# Patient Record
Sex: MALE | Race: WHITE
[De-identification: names, ages, dates, MRNs, and addresses within clinical notes are randomized per-mention and may not be internally consistent; named-entity substitution may affect disease eponyms.]

---

## 2018-02-22 ENCOUNTER — HOSPITAL ENCOUNTER (INPATIENT)
Dept: HOSPITAL 41 - JD.ED | Age: 63
LOS: 1 days | Discharge: HOME | DRG: 897 | End: 2018-02-23
Payer: MEDICARE

## 2018-02-22 DIAGNOSIS — I10: ICD-10-CM

## 2018-02-22 DIAGNOSIS — F17.200: ICD-10-CM

## 2018-02-22 DIAGNOSIS — E78.00: ICD-10-CM

## 2018-02-22 DIAGNOSIS — Z79.899: ICD-10-CM

## 2018-02-22 DIAGNOSIS — E11.65: ICD-10-CM

## 2018-02-22 DIAGNOSIS — N31.9: ICD-10-CM

## 2018-02-22 DIAGNOSIS — J44.9: ICD-10-CM

## 2018-02-22 DIAGNOSIS — F02.80: ICD-10-CM

## 2018-02-22 DIAGNOSIS — Z91.19: ICD-10-CM

## 2018-02-22 DIAGNOSIS — R53.1: ICD-10-CM

## 2018-02-22 DIAGNOSIS — F12.929: Primary | ICD-10-CM

## 2018-02-22 DIAGNOSIS — J32.1: ICD-10-CM

## 2018-02-22 DIAGNOSIS — G35: ICD-10-CM

## 2018-02-22 DIAGNOSIS — F06.8: ICD-10-CM

## 2018-02-22 DIAGNOSIS — E78.5: ICD-10-CM

## 2018-02-22 DIAGNOSIS — Z79.82: ICD-10-CM

## 2018-02-22 DIAGNOSIS — C91.10: ICD-10-CM

## 2018-02-22 DIAGNOSIS — Z79.4: ICD-10-CM

## 2018-02-22 PROCEDURE — 71045 X-RAY EXAM CHEST 1 VIEW: CPT

## 2018-02-22 PROCEDURE — 70450 CT HEAD/BRAIN W/O DYE: CPT

## 2018-02-22 PROCEDURE — C9113 INJ PANTOPRAZOLE SODIUM, VIA: HCPCS

## 2018-02-22 PROCEDURE — 80053 COMPREHEN METABOLIC PANEL: CPT

## 2018-02-22 PROCEDURE — 84484 ASSAY OF TROPONIN QUANT: CPT

## 2018-02-22 PROCEDURE — 81001 URINALYSIS AUTO W/SCOPE: CPT

## 2018-02-22 PROCEDURE — 87804 INFLUENZA ASSAY W/OPTIC: CPT

## 2018-02-22 PROCEDURE — 99285 EMERGENCY DEPT VISIT HI MDM: CPT

## 2018-02-22 PROCEDURE — 86140 C-REACTIVE PROTEIN: CPT

## 2018-02-22 PROCEDURE — 84443 ASSAY THYROID STIM HORMONE: CPT

## 2018-02-22 PROCEDURE — 80306 DRUG TEST PRSMV INSTRMNT: CPT

## 2018-02-22 PROCEDURE — 85025 COMPLETE CBC W/AUTO DIFF WBC: CPT

## 2018-02-22 PROCEDURE — 85652 RBC SED RATE AUTOMATED: CPT

## 2018-02-22 PROCEDURE — 87040 BLOOD CULTURE FOR BACTERIA: CPT

## 2018-02-22 PROCEDURE — 96372 THER/PROPH/DIAG INJ SC/IM: CPT

## 2018-02-22 PROCEDURE — P9612 CATHETERIZE FOR URINE SPEC: HCPCS

## 2018-02-22 PROCEDURE — 83735 ASSAY OF MAGNESIUM: CPT

## 2018-02-22 PROCEDURE — 83036 HEMOGLOBIN GLYCOSYLATED A1C: CPT

## 2018-02-22 PROCEDURE — 82550 ASSAY OF CK (CPK): CPT

## 2018-02-22 PROCEDURE — 36415 COLL VENOUS BLD VENIPUNCTURE: CPT

## 2018-02-22 PROCEDURE — 80349 CANNABINOIDS NATURAL: CPT

## 2018-02-22 PROCEDURE — 93005 ELECTROCARDIOGRAM TRACING: CPT

## 2018-02-22 RX ADMIN — AMOXICILLIN AND CLAVULANATE POTASSIUM SCH TAB: 875; 125 TABLET, FILM COATED ORAL at 20:52

## 2018-02-22 NOTE — CR
Chest: Portable view of the chest was obtained.

 

Comparison: Prior chest x-ray of 02/12/16.

 

Heart size and mediastinum are normal.  Lung markings are mildly 

increased believed to be chronic and accentuated from portable 

technique.  No acute parenchymal densities are seen.  Minimal 

scoliosis is noted within the spine.

 

Impression:

1.  Incidental findings.  Nothing acute is suspected on portable chest

 x-ray.

 

Diagnostic code #2

## 2018-02-22 NOTE — CT
Head CT

 

Technique: Multiple axial sections through the brain were obtained.  

Intravenous contrast was not utilized.

 

Comparison: No prior head CT exam.

 

Findings: Ventricles along with basal cisterns and sulci over the 

convexities are moderately prominent.  Diminished density is noted 

within the periventricular and subcortical white matter which is felt 

to represent a combination of small vessel ischemic demyelination 

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  No other abnormal 

parenchymal densities are seen.  No evidence of intracranial 

hemorrhage.  No midline shift or mass effect is seen.

 

Bone window settings were reviewed which shows opacified left frontal 

sinus as well as partial opacification of the right frontal sinus.  

Sinus appears somewhat expanded.  No acute calvarial abnormality is 

otherwise seen.

 

Impression:

1.  Opacified left frontal sinus and partially opacified right frontal

 sinus.  This causes some expansion of the sinus and difficult to 

exclude a mildly aggressive infection, mucocele or even sinus 

neoplasm.

2.  Findings intracranially as noted above felt to be chronic.

3.  No acute intracranial abnormality is appreciated.

 

Diagnostic code #9

## 2018-02-22 NOTE — EDM.PDOC
ED HPI GENERAL MEDICAL PROBLEM





- General


Chief Complaint: Neurological Problem


Stated Complaint: [facility]


Time Seen by Provider: 02/22/18 07:52


Source of Information: Reports: Patient, Family (Wife, daughter)


History Limitations: Reports: Altered Mental Status (The majority of the patient

's history is provided by his wife)





- History of Present Illness


INITIAL COMMENTS - FREE TEXT/NARRATIVE: 





The patient's wife states that the patient has a history of MS, diagnosed 

approximately 2003, likely primary progressive. She states that he has been 

under the care of the Neurologist Dr. Margaret Edgar, but that the patient 

has never received any treatment, and, as far as the patient's wife is aware, 

the patient has never received a course of steroids. The patient's wife states 

that Dr. Edgar, to their knowledge, no longer works in the area.





The patient also has a history of CLL, under the care of an Oncologist at [facility], but, again, the patient's wife states that the patient has 

never received any treatment. She states that they check his blood work twice a 

year, but that is all, so far.





The patient's wife states that the patient was talking like he was drunk, 

without emotion, yesterday. Today he was unable to ambulate. He ordinarily 

ambulates with a cane, but this morning he slid out of bed, unable to support 

his own weight (he is uninjured). His blood glucose was found to be 275 - the 

patient is diabetic but states that he did not take his usual Lantus insulin 

this morning.





The patient denies having any pain. He denies having a headache. He denies 

recent fever, chills, cough, chest pain, dyspnea, nausea, vomiting, constipation

, diarrhea, urinary symptoms, weight gain, or weight loss.





The patient's wife states that he had similar symptoms last year, stating that 

he was admitted to the ICU for 3 days. Medical records indicate that the 

patient was admitted to this hospital on 2/5/2015 for what sounds to be a MS 

exacerbation. He was then readmitted on 2/12/2015 for DKA.





The patient's PCP is Dr. Brittanie Kirk.











- Related Data


 Allergies











Allergy/AdvReac Type Severity Reaction Status Date / Time


 


No Known Allergies Allergy   Verified 02/22/18 07:38











Home Meds: 


 Home Meds





Aspirin [Halfprin] 1 tab PO DAILY 02/05/15 [History]


Simvastatin 10 mg PO DAILY 02/05/15 [History]


buPROPion [Wellbutrin XL] 300 mg PO DAILY 02/05/15 [History]


Insulin Aspart [NovoLOG] 0 units SUBCUT ASDIRECTED 02/12/16 [History]


Lisinopril [Zestril] 5 mg PO DAILY 02/12/16 [History]


Insulin Glarg,Human.Rec.Analog [LantUS Solostar] 44 units SUBCUT DAILY 02/22/18 

[History]











Past Medical History


Cardiovascular History: Reports: High Cholesterol, Hypertension


Respiratory History: Reports: COPD


Genitourinary History: Reports: Neurogenic Bladder


Neurological History: Reports: MS


Endocrine/Metabolic History: Reports: Diabetes, Type II


Oncologic (Cancer) History: Reports: Leukemia (CLL)





- Past Surgical History


GI Surgical History: Reports: Appendectomy





Social & Family History





- Family History


Family Medical History: Noncontributory





- Tobacco Use


Smoking Status *Q: Current Some Day Smoker


Years of Tobacco use: 32


Packs/Tins Daily: 0.1





- Caffeine Use


Caffeine Use: Reports: Coffee, Soda





- Alcohol Use


Alcohol Use History: Yes


Days Per Week of Alcohol Use: 0


Number of Drinks Per Day: 1


Total Drinks Per Week: 0


Alcohol Use Frequency: Socially





- Recreational Drug Use


Recreational Drug Use: Yes


Drug Use in Last 12 Months: Yes


Recreational Drug Type: Reports: Marijuana/Hashish (on occasion)





- Living Situation & Occupation


Living situation: Reports: , with Spouse


Occupation: Disabled





ED ROS GENERAL





- Review of Systems


Review Of Systems: ROS reveals no pertinent complaints other than HPI.





ED EXAM, GENERAL





- Physical Exam


Exam: See Below


Exam Limited By: No Limitations


General Appearance: Alert, WD/WN, No Apparent Distress


Eye Exam: Bilateral Eye: Normal Inspection


Ears: Normal External Exam, Hearing Grossly Normal


Nose: Normal Inspection, No Blood


Throat/Mouth: Normal Inspection, Normal Lips, Normal Voice, No Airway Compromise


Head: Atraumatic, Normocephalic


Neck: Normal Inspection, Full Range of Motion


Respiratory/Chest: No Respiratory Distress, Lungs Clear, Normal Breath Sounds, 

No Accessory Muscle Use


Cardiovascular: Normal Peripheral Pulses, Regular Rate, Rhythm, No Gallop, No 

JVD, No Murmur, No Rub


Peripheral Pulses: 4+: Radial (L), Radial (R)


GI/Abdominal: Normal Bowel Sounds, Soft, Non-Tender, No Organomegaly, No 

Distention, No Abnormal Bruit, No Mass


 (Male) Exam: Deferred


Rectal (Males) Exam: Deferred


Back Exam: Normal Inspection, Full Range of Motion, NT


Extremities: Normal Inspection, Normal Range of Motion, No Pedal Edema, Normal 

Capillary Refill


Neurological: Alert, Other (Minimal weakness to the upper 70s, but more 

pronounced weakness to the lower extremity, particularly the left lower 

extremity, in both flexion and extension of the knee and hip. Plantarflexion 

and dorsiflexion felt to be normal bilaterally. Further, the patient had some 

difficulty following commands. He needed to be instructed numerous times to 

both flex and extend his left lower extremity. Sensory is normal.)


Psychiatric: Normal Affect


Skin Exam: Warm, Dry, Intact, Normal Color, No Rash





EKG INTERPRETATION


EKG Date: 02/22/18


Time: 09:04


Rhythm: NSR


Rate (Beats/Min): 69


Axis: Normal


P-Wave: Present


QRS: Normal


ST-T: Normal


QT: Normal





Course





- Vital Signs


Last Recorded V/S: 


 Last Vital Signs











Temp  36.2 C   02/22/18 07:35


 


Pulse  80   02/22/18 07:35


 


Resp  18   02/22/18 07:35


 


BP  108/59 L  02/22/18 07:35


 


Pulse Ox  94 L  02/22/18 07:35














- Orders/Labs/Meds


Orders: 


 Active Orders 24 hr











 Category Date Time Status


 


 Antiembolic Devices [RC] PER UNIT ROUTINE Care  02/22/18 11:45 Active


 


 EKG Documentation Completion [RC] STAT Care  02/22/18 08:16 Active


 


 Height and Weight [RC] DAILY Care  02/22/18 11:44 Active


 


 Intake and Output [RC] QSHIFT Care  02/22/18 11:44 Active


 


 Oxygen Therapy [RC] PRN Care  02/22/18 11:44 Active


 


 RT Aerosol Therapy [RC] ASDIRECTED Care  02/22/18 11:45 Active


 


 Up With Assistance [RC] ASDIRECTED Care  02/22/18 11:44 Active


 


 Up ad [place] [RC] ASDIRECTED Care  02/22/18 11:44 Active


 


 VTE/DVT Education [RC] PER UNIT ROUTINE Care  02/22/18 11:44 Active


 


 Vital Signs [RC] Q4H Care  02/22/18 11:44 Active


 


 Consistent Carbohydrate Diet [DIET] Diet  02/22/18 Lunch Active


 


 Heart Healthy Diet [DIET] Diet  02/22/18 Lunch Active


 


 A1C [GLYCOSYLATED HEMOGLOBIN,HGBA1C] [CHEM] Stat Lab  02/22/18 08:30 Received


 


 CBC WITH AUTO DIFF [HEME] AM Lab  02/23/18 05:11 Ordered


 


 CBC WITH AUTO DIFF [HEME] AM Lab  02/24/18 05:11 Ordered


 


 CBC WITH AUTO DIFF [HEME] AM Lab  02/25/18 05:11 Ordered


 


 CBC WITH AUTO DIFF [HEME] AM Lab  02/26/18 05:11 Ordered


 


 CULTURE BLOOD [BC] Stat Lab  02/22/18 08:30 Received


 


 CULTURE BLOOD [BC] Stat Lab  02/22/18 08:40 Received


 


 CULTURE NOSE [RM] Stat Lab  02/22/18 11:50 Ordered


 


 Acetaminophen [Tylenol] Med  02/22/18 11:44 Ordered





 650 mg PO Q4H PRN   


 


 Acetaminophen/HYDROcodone [Norco 325-5 MG] Med  02/22/18 11:44 Ordered





 1 tab PO Q4H PRN   


 


 Albuterol/Ipratropium [DuoNeb 3.0-0.5 MG/3 ML] Med  02/22/18 11:44 Ordered





 3 ml NEB Q4H PRN   


 


 Amoxicillin/Clavulanate K [Augmentin 875 MG/125 MG] Med  02/22/18 21:00 Ordered





 1 tab PO Q12HR   


 


 Aspirin [Halfprin] Med  02/23/18 09:00 Ordered





 DOSE mg PO DAILY   


 


 Bisacodyl [Dulcolax] Med  02/22/18 11:44 Ordered





 5 mg PO DAILY PRN   


 


 Docusate Sodium [Colace] Med  02/22/18 11:44 Ordered





 100 mg PO BID PRN   


 


 Docusate Sodium/Sennosides [Senna Plus] Med  02/22/18 11:44 Ordered





 1 tab PO BID PRN   


 


 Insulin Glarg,Human.Rec.Analog Med  02/23/18 09:00 Ordered





 44 units SUBCUT DAILY   


 


 LORazepam [Ativan] Med  02/22/18 11:44 Ordered





 1 mg IV Q6H PRN   


 


 LORazepam [Ativan] Med  02/22/18 11:47 Ordered





 2 mg IVPUSH Q4H PRN   


 


 Lisinopril [Prinivil] Med  02/23/18 09:00 Ordered





 5 mg PO DAILY   


 


 Magnesium Rep Pharmacy to Dose [Pharmacy to Dose - Med  02/22/18 12:00 Ordered





 Magnesium Replacement]   





 1 dose .XX ASDIRECTED   


 


 Magnesium Sulfate/Water [Magnesium Sulfate 2 GM in Med  02/22/18 11:47 Ordered





 Water 50 ML] 2 gm   





 Premix Bag 1 bag   





 IV ASDIRECTED   


 


 Metoprolol Tartrate [Lopressor] Med  02/22/18 11:47 Ordered





 5 mg IVPUSH Q4H PRN   


 


 Morphine Med  02/22/18 11:44 Ordered





 2 mg IVPUSH Q2H PRN   


 


 Ondansetron [Zofran] Med  02/22/18 11:44 Ordered





 4 mg IV Q6H PRN   


 


 Pantoprazole [ProTONIX IV***] Med  02/22/18 11:44 Once





 40 mg .XX ONETIME ONE   


 


 Polyethylene Glycol 3350 [MiraLAX] Med  02/22/18 11:44 Ordered





 17 gm PO DAILY PRN   


 


 Potassium Rep Pharmacy to Dose [Pharmacy to Dose - Med  02/22/18 12:00 Ordered





 Potassium Replacement]   





 1 dose .XX ASDIRECTED   


 


 Promethazine [Phenergan] 12.5 mg Med  02/22/18 11:44 Ordered





 Sodium Chloride 0.9% [Normal Saline] 50 ml   





 IV Q6H   


 


 Simvastatin [Simvastatin] Med  02/23/18 09:00 Ordered





 10 mg PO DAILY   


 


 Temazepam [Restoril] Med  02/22/18 11:44 Ordered





 15 mg PO BEDTIME PRN   


 


 buPROPion [Wellbutrin XL] Med  02/23/18 09:00 Ordered





 300 mg PO DAILY   


 


 Blood Culture x2 Reflex Set [OM.PC] Stat Oth  02/22/18 08:20 Ordered


 


 Sequential Compression Device [OM.PC] Per Unit Routine Oth  02/22/18 11:44 

Ordered


 


 Resuscitation Status Routine Resus Stat  02/22/18 11:44 Ordered








 Medication Orders





Acetaminophen (Tylenol)  650 mg PO Q4H PRN


   PRN Reason: Pain (Mild 1-3)/fever


Hydrocodone Bitart/Acetaminophen (Norco 325-5 Mg)  1 tab PO Q4H PRN


   PRN Reason: Pain (moderate 4-6)


Albuterol/Ipratropium (Duoneb 3.0-0.5 Mg/3 Ml)  3 ml NEB Q4H PRN


   PRN Reason: Shortness Of Breath/wheezing


Amoxicillin/Clavulanate Potassium (Augmentin 875 Mg/125 Mg)  1 tab PO Q12HR [facility]


Aspirin (Halfprin)   mg PO DAILY [facility]


Bisacodyl (Dulcolax)  5 mg PO DAILY PRN


   PRN Reason: Constipation


Docusate Sodium (Colace)  100 mg PO BID PRN


   PRN Reason: Constipation


Promethazine HCl 12.5 mg/ (Sodium Chloride)  50.5 mls @ 100 mls/hr IV Q6H PRN


   PRN Reason: Nausea/Vomiting


Magnesium Sulfate 2 gm/ Premix  50 mls @ 25 mls/hr IV ASDIRECTED PRN


   PRN Reason: Other


Lisinopril (Prinivil)  5 mg PO DAILY [facility]


Lorazepam (Ativan)  1 mg IV Q6H PRN


   PRN Reason: Nausea/Vomiting


Lorazepam (Ativan)  2 mg IVPUSH Q4H PRN


   PRN Reason: Seizures


Magnesium Sulfate (Pharmacy To Dose - Magnesium Replacement)  1 dose .XX 

ASDIRECTED [facility]


Metoprolol Tartrate (Lopressor)  5 mg IVPUSH Q4H PRN


   PRN Reason: Tachycardia


Morphine Sulfate (Morphine)  2 mg IVPUSH Q2H PRN


   PRN Reason: Pain (severe 7-10)


   Stop: 02/23/18 11:45


Non-Formulary Medication (Bupropion [Wellbutrin Xl])  300 mg PO DAILY [facility]


Non-Formulary Medication (Insulin Glarg,Human.Rec.Analog)  44 units SUBCUT 

DAILY [facility]


Non-Formulary Medication (Simvastatin [Simvastatin])  10 mg PO DAILY [facility]


Ondansetron HCl (Zofran)  4 mg IV Q6H PRN


   PRN Reason: Nausea/Vomiting


Pantoprazole Sodium (Protonix Iv***)  40 mg .XX ONETIME ONE


   Stop: 02/22/18 11:45


Polyethylene Glycol (Miralax)  17 gm PO DAILY PRN


   PRN Reason: Constipation


Potassium Chloride (Pharmacy To Dose - Potassium Replacement)  1 dose .XX 

ASDIRECTED [facility]


Senna/Docusate Sodium (Senna Plus)  1 tab PO BID PRN


   PRN Reason: Constipation


Temazepam (Restoril)  15 mg PO BEDTIME PRN


   PRN Reason: Sleep








Labs: 


 Laboratory Tests











  02/22/18 02/22/18 02/22/18 Range/Units





  08:30 08:30 08:30 


 


WBC  19.71 H    (4.23-9.07)  K/mm3


 


RBC  5.39    (4.63-6.08)  M/mm3


 


Hgb  15.6    (13.7-17.5)  gm/L


 


Hct  46.4    (40.1-51.0)  %


 


MCV  86.1    (79.0-92.2)  fl


 


MCH  28.9    (25.7-32.2)  pg


 


MCHC  33.6    (32.2-35.5)  g/dl


 


RDW Std Deviation  43.6    (35.1-43.9)  fL


 


Plt Count  250    (163-337)  K/mm3


 


MPV  11.0    (9.4-12.3)  fl


 


Neutrophils % (Manual)  60    (40-60)  %


 


Band Neutrophils %  1    (0-10)  %


 


Lymphocytes % (Manual)  37    (20-40)  %


 


Atypical Lymphs %  0    %


 


Monocytes % (Manual)  1 L    (2-10)  %


 


Eosinophils % (Manual)  0 L    (0.8-7.0)  %


 


Basophils % (Manual)  1    (0.2-1.2)  


 


Differential Comment  See note    


 


Platelet Estimate  Adequate    


 


RBC Morph Comment  Normal    


 


ESR    7  (0-15)  mm/hr


 


Sodium   137   (136-145)  mEq/L


 


Potassium   5.0   (3.5-5.1)  mEq/L


 


Chloride   102   ()  mEq/L


 


Carbon Dioxide   26   (21-32)  mEq/L


 


Anion Gap   14.0   (5-15)  


 


BUN   19 H   (7-18)  mg/dL


 


Creatinine   1.2   (0.7-1.3)  mg/dL


 


Est Cr Clr Drug Dosing   61.75   mL/min


 


Estimated GFR (MDRD)   > 60   (>60)  mL/min


 


BUN/Creatinine Ratio   15.8   (14-18)  


 


Glucose   334 H   ()  mg/dL


 


Calcium   8.5   (8.5-10.1)  mg/dL


 


Magnesium   2.0   (1.8-2.4)  mg/dl


 


Total Bilirubin   0.5   (0.2-1.0)  mg/dL


 


AST   20   (15-37)  U/L


 


ALT   30   (16-63)  U/L


 


Alkaline Phosphatase   102   ()  U/L


 


Creatine Kinase   54   ()  U/L


 


Troponin I   < 0.017   (0.00-0.056)  ng/mL


 


C-Reactive Protein   < 0.2   (<1.0)  mg/dL


 


Total Protein   6.9   (6.4-8.2)  g/dl


 


Albumin   3.5   (3.4-5.0)  g/dl


 


Globulin   3.4   gm/dL


 


Albumin/Globulin Ratio   1.0   (1-2)  


 


TSH 3rd Generation   0.625   (0.358-3.74)  uIU/mL


 


Urine Color     (Yellow)  


 


Urine Appearance     (Clear)  


 


Urine pH     (5.0-8.0)  


 


Ur Specific Gravity     (1.005-1.030)  


 


Urine Protein     (Negative)  


 


Urine Glucose (UA)     (Negative)  


 


Urine Ketones     (Negative)  


 


Urine Occult Blood     (Negative)  


 


Urine Nitrite     (Negative)  


 


Urine Bilirubin     (Negative)  


 


Urine Urobilinogen     (0.2-1.0)  


 


Ur Leukocyte Esterase     (Negative)  


 


Urine RBC     (0-5)  /hpf


 


Urine WBC     (0-5)  /hpf


 


Ur Epithelial Cells     (0-5)  /hpf


 


Urine Bacteria     (FEW)  /hpf


 


Urine Mucus     (FEW)  /hpf














  02/22/18 Range/Units





  09:25 


 


WBC   (4.23-9.07)  K/mm3


 


RBC   (4.63-6.08)  M/mm3


 


Hgb   (13.7-17.5)  gm/L


 


Hct   (40.1-51.0)  %


 


MCV   (79.0-92.2)  fl


 


MCH   (25.7-32.2)  pg


 


MCHC   (32.2-35.5)  g/dl


 


RDW Std Deviation   (35.1-43.9)  fL


 


Plt Count   (163-337)  K/mm3


 


MPV   (9.4-12.3)  fl


 


Neutrophils % (Manual)   (40-60)  %


 


Band Neutrophils %   (0-10)  %


 


Lymphocytes % (Manual)   (20-40)  %


 


Atypical Lymphs %   %


 


Monocytes % (Manual)   (2-10)  %


 


Eosinophils % (Manual)   (0.8-7.0)  %


 


Basophils % (Manual)   (0.2-1.2)  


 


Differential Comment   


 


Platelet Estimate   


 


RBC Morph Comment   


 


ESR   (0-15)  mm/hr


 


Sodium   (136-145)  mEq/L


 


Potassium   (3.5-5.1)  mEq/L


 


Chloride   ()  mEq/L


 


Carbon Dioxide   (21-32)  mEq/L


 


Anion Gap   (5-15)  


 


BUN   (7-18)  mg/dL


 


Creatinine   (0.7-1.3)  mg/dL


 


Est Cr Clr Drug Dosing   mL/min


 


Estimated GFR (MDRD)   (>60)  mL/min


 


BUN/Creatinine Ratio   (14-18)  


 


Glucose   ()  mg/dL


 


Calcium   (8.5-10.1)  mg/dL


 


Magnesium   (1.8-2.4)  mg/dl


 


Total Bilirubin   (0.2-1.0)  mg/dL


 


AST   (15-37)  U/L


 


ALT   (16-63)  U/L


 


Alkaline Phosphatase   ()  U/L


 


Creatine Kinase   ()  U/L


 


Troponin I   (0.00-0.056)  ng/mL


 


C-Reactive Protein   (<1.0)  mg/dL


 


Total Protein   (6.4-8.2)  g/dl


 


Albumin   (3.4-5.0)  g/dl


 


Globulin   gm/dL


 


Albumin/Globulin Ratio   (1-2)  


 


TSH 3rd Generation   (0.358-3.74)  uIU/mL


 


Urine Color  Yellow  (Yellow)  


 


Urine Appearance  Clear  (Clear)  


 


Urine pH  6.0  (5.0-8.0)  


 


Ur Specific Gravity  1.020  (1.005-1.030)  


 


Urine Protein  Negative  (Negative)  


 


Urine Glucose (UA)  2+ H  (Negative)  


 


Urine Ketones  Trace H  (Negative)  


 


Urine Occult Blood  Trace-intact H  (Negative)  


 


Urine Nitrite  Negative  (Negative)  


 


Urine Bilirubin  Negative  (Negative)  


 


Urine Urobilinogen  1.0  (0.2-1.0)  


 


Ur Leukocyte Esterase  Negative  (Negative)  


 


Urine RBC  0-5  (0-5)  /hpf


 


Urine WBC  0-5  (0-5)  /hpf


 


Ur Epithelial Cells  Not seen  (0-5)  /hpf


 


Urine Bacteria  Few  (FEW)  /hpf


 


Urine Mucus  Few  (FEW)  /hpf











Meds: 


Medications











Generic Name Dose Route Start Last Admin





  Trade Name Freq  PRN Reason Stop Dose Admin


 


Acetaminophen  650 mg  02/22/18 11:44  





  Tylenol  PO   





  Q4H PRN   





  Pain (Mild 1-3)/fever   


 


Hydrocodone Bitart/Acetaminophen  1 tab  02/22/18 11:44  





  Norco 325-5 Mg  PO   





  Q4H PRN   





  Pain (moderate 4-6)   


 


Albuterol/Ipratropium  3 ml  02/22/18 11:44  





  Duoneb 3.0-0.5 Mg/3 Ml  NEB   





  Q4H PRN   





  Shortness Of Breath/wheezing   


 


Amoxicillin/Clavulanate Potassium  1 tab  02/22/18 21:00  





  Augmentin 875 Mg/125 Mg  PO   





  Q12HR [facility]   


 


Aspirin   mg  02/23/18 09:00  





  Halfprin  PO   





  DAILY [facility]   


 


Bisacodyl  5 mg  02/22/18 11:44  





  Dulcolax  PO   





  DAILY PRN   





  Constipation   


 


Docusate Sodium  100 mg  02/22/18 11:44  





  Colace  PO   





  BID PRN   





  Constipation   


 


Promethazine HCl 12.5 mg/  50.5 mls @ 100 mls/hr  02/22/18 11:44  





  Sodium Chloride  IV   





  Q6H PRN   





  Nausea/Vomiting   


 


Magnesium Sulfate 2 gm/ Premix  50 mls @ 25 mls/hr  02/22/18 11:47  





  IV   





  ASDIRECTED PRN   





  Other   


 


Lisinopril  5 mg  02/23/18 09:00  





  Prinivil  PO   





  DAILY [facility]   


 


Lorazepam  1 mg  02/22/18 11:44  





  Ativan  IV   





  Q6H PRN   





  Nausea/Vomiting   


 


Lorazepam  2 mg  02/22/18 11:47  





  Ativan  IVPUSH   





  Q4H PRN   





  Seizures   


 


Magnesium Sulfate  1 dose  02/22/18 12:00  





  Pharmacy To Dose - Magnesium Replacement  .XX   





  ASDIRECTED [facility]   


 


Metoprolol Tartrate  5 mg  02/22/18 11:47  





  Lopressor  IVPUSH   





  Q4H PRN   





  Tachycardia   


 


Morphine Sulfate  2 mg  02/22/18 11:44  





  Morphine  IVPUSH  02/23/18 11:45  





  Q2H PRN   





  Pain (severe 7-10)   


 


Non-Formulary Medication  300 mg  02/23/18 09:00  





  Bupropion [Wellbutrin Xl]  PO   





  DAILY [facility]   


 


Non-Formulary Medication  44 units  02/23/18 09:00  





  Insulin Glarg,Human.Rec.Analog  SUBCUT   





  DAILY [facility]   


 


Non-Formulary Medication  10 mg  02/23/18 09:00  





  Simvastatin [Simvastatin]  PO   





  DAILY [facility]   


 


Ondansetron HCl  4 mg  02/22/18 11:44  





  Zofran  IV   





  Q6H PRN   





  Nausea/Vomiting   


 


Pantoprazole Sodium  40 mg  02/22/18 11:44  





  Protonix Iv***  .XX  02/22/18 11:45  





  ONETIME ONE   


 


Polyethylene Glycol  17 gm  02/22/18 11:44  





  Miralax  PO   





  DAILY PRN   





  Constipation   


 


Potassium Chloride  1 dose  02/22/18 12:00  





  Pharmacy To Dose - Potassium Replacement  .XX   





  ASDIRECTED [facility]   


 


Senna/Docusate Sodium  1 tab  02/22/18 11:44  





  Senna Plus  PO   





  BID PRN   





  Constipation   


 


Temazepam  15 mg  02/22/18 11:44  





  Restoril  PO   





  BEDTIME PRN   





  Sleep   














Discontinued Medications














Generic Name Dose Route Start Last Admin





  Trade Name Freq  PRN Reason Stop Dose Admin


 


Amoxicillin/Clavulanate Potassium  1 tab  02/22/18 11:18  02/22/18 11:37





  Augmentin 875 Mg/125 Mg  PO  02/22/18 11:19  1 tab





  ONETIME STA   Administration


 


Insulin Human Regular  10 unit  02/22/18 11:37  





  Humulin R  SUBCUT  02/22/18 11:38  





  ONETIME STA   














- Re-Assessments/Exams


Free Text/Narrative Re-Assessment/Exam: 





02/22/18 09:23


Portable chest radiograph reviewed.  Cardiac silhouette is within normal 

limits.  No pulmonary vascular congestion.  No pleural effusions.  She states 

in his bibasilar, likely due to poor inspiration, but cannot exclude 

interstitial infiltrates, consistent with viral pneumonia versus pulmonary 

fibrosis.  No pneumothorax.  Formal read per the Radiologist pending.








02/22/18 10:11


Dr. Martinez reads the portable chest radiograph as:


1. Incidental findings. Nothing acute is suspected on portable chest x-ray.








CT of the head without contrast is read by Dr. Martinez as:


1. Opacified left frontal sinus and partially opacified right frontal sinus. 

This causes some expansion of the sinus and difficult to exclude a mildly 

aggressive infection, mucocele or even sinus neoplasm.


2. Findings intracranially as noted above felt to be chronic.


3. No acute intracranial abnormality is appreciated.








02/22/18 11:05


The patient's WBC count is elevated at 19.71, but with 1% bandemia. His blood 

glucose is elevated at 334. As above, the CT scan of his head demonstrates what 

appears to be a sinus infection, but neoplasm cannot be ruled out. The 

remainder of the patient's workup is unremarkable.





Case discussed with [facility] at 11:03. Dr. Margaret Edgar, the patient

's Neurologist, is no longer available through a [facility]. Since the 

patient's Oncologist is through [facility], I will discuss the case 

with a [facility] Neurologist.








02/22/18 11:14


Case discussed with Dr. Valdez, Neurologist at [facility], at 11:

10. He does not believe that the patient's symptoms are due to an MS 

exacerbation. He believes we should treat the patient for possible infection 

and see if his symptoms improve. If they do not, the patient can always be 

transferred to their service down the road. If the patient does improve, he 

would like the patient to follow-up with either him or one of his colleagues.








02/22/18 11:17


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 

Augmentin.








02/22/18 11:22


Case discussed with Dr. Hudson at 11:18. He agrees to admit the patient to Med-

Surg.








02/22/18 11:36


The above plan was discussed with the patient and his wife. They are agreeable.





Departure





- Departure


Time of Disposition: 11:15


Disposition: Admitted As Inpatient 66


Condition: Fair


Clinical Impression: 


 Generalized weakness, Hyperglycemia due to type 2 diabetes mellitus








- Discharge Information


Referrals: 


Brittanie Kirk MD [Primary Care Provider] - 





- My Orders


Last 24 Hours: 


My Active Orders





02/22/18 08:16


EKG Documentation Completion [RC] STAT 





02/22/18 08:20


Blood Culture x2 Reflex Set [OM.PC] Stat 





02/22/18 08:30


CULTURE BLOOD [BC] Stat 





02/22/18 08:40


CULTURE BLOOD [BC] Stat 














- Assessment/Plan


Last 24 Hours: 


My Active Orders





02/22/18 08:16


EKG Documentation Completion [RC] STAT 





02/22/18 08:20


Blood Culture x2 Reflex Set [OM.PC] Stat 





02/22/18 08:30


CULTURE BLOOD [BC] Stat 





02/22/18 08:40


CULTURE BLOOD [BC] Stat

## 2018-02-22 NOTE — PCM.HP
H&P History of Present Illness





- General


Date of Service: 02/22/18


Admit Problem/Dx: 


 Admission Diagnosis/Problem





Admission Diagnosis/Problem      Weakness








Source of Information: Patient, Family, Old Records, Provider, RN Notes Reviewed


History Limitations: Reports: Altered Mental Status, Physical Impairment





- History of Present Illness


Initial Comments - Free Text/Narative: 


This is a 61 yo disabled white male with a past medical history of hypertension

, hyperlipidemia, COPD, neurogenic bladder, primary progressive MS, type 2 

diabetes, CLL and history of substance abuse who presents to the emergency 

department for evaluation of altered mental status. Patient is a poor historian 

and unable to provide pertinent information related to his history of present 

illness.





According to his wife, the patient appeared drunk and without emotion 

yesterday. He was unable to ambulate on his own. However he uses an assistive 

device ordinarily but this morning he was not able to use it. He also slid out 

of bed and unable to get himself out. During that time, he was found with a 

blood glucose level of 275 and it appeared he did not take his insulin as 

scheduled.





Patient denies any pain, no headache, no vision changes. He further denies any 

signs of systemic infection or acute GI/ issues. His initial workup in the 

emergency department shows a CBC remarkable for WBC of 19.71, and monocytes of 1

%. His chemistry is remarkable for a BUN of 19, glucose of 334 and A1c of 7.5. 

His ESR, CK, Troponin and CRP are all within the normal limits. Chest x-ray 

shows no acute abnormal findings. He said CT scan shows no acute intra-cranial 

abnormality but with the findings of opacified left frontal sinus and partially 

opacified right frontal sinus: Infection versus mucocele versus sinus neoplasm.





Patient is being admitted for altered mental status and medical management of 

possible sinusitis. He is full code.








- Related Data


Allergies/Adverse Reactions: 


 Allergies











Allergy/AdvReac Type Severity Reaction Status Date / Time


 


No Known Allergies Allergy   Verified 02/22/18 15:02











Home Medications: 


 Home Meds





Aspirin [Halfprin] 1 tab PO DAILY 02/05/15 [History]


Simvastatin 10 mg PO DAILY 02/05/15 [History]


buPROPion [Wellbutrin XL] 300 mg PO DAILY 02/05/15 [History]


Insulin Aspart [NovoLOG] 0 units SUBCUT ASDIRECTED 02/12/16 [History]


Lisinopril [Zestril] 5 mg PO DAILY 02/12/16 [History]


Insulin Glarg,Human.Rec.Analog [LantUS Solostar] 44 units SUBCUT DAILY 02/22/18 

[History]


Amoxicillin/Clavulanate K [Augmentin 875-125 MG] 1 tab PO Q12HR #13 tablet 02/23 /18 [Rx]


Saccharomyces Boulardii [Florastor] 250 mg PO BID #13 cap 02/23/18 [Rx]











Past Medical History


Cardiovascular History: Reports: High Cholesterol, Hypertension


Respiratory History: Reports: COPD


Genitourinary History: Reports: Neurogenic Bladder


Neurological History: Reports: MS


Endocrine/Metabolic History: Reports: Diabetes, Type II


Oncologic (Cancer) History: Reports: Leukemia (CLL)


Other Oncologic History: CLL





- Past Surgical History


GI Surgical History: Reports: Appendectomy





Social & Family History





- Family History


Family Medical History: Noncontributory





- Tobacco Use


Smoking Status *Q: Current Some Day Smoker


Years of Tobacco use: 32


Packs/Tins Daily: 0.1


Used Tobacco, but Quit: Yes


Month Tobacco Last Used: 10/14


Second Hand Smoke Exposure: No





- Caffeine Use


Caffeine Use: Reports: Coffee, Soda





- Alcohol Use


Days Per Week of Alcohol Use: 0


Number of Drinks Per Day: 1


Total Drinks Per Week: 0





- Recreational Drug Use


Recreational Drug Use: Yes


Drug Use in Last 12 Months: Yes


Recreational Drug Type: Reports: Marijuana/Hashish (on occasion)





- Living Situation & Occupation


Living situation: Reports: , with Spouse


Occupation: Disabled





H&P Review of Systems





- Review of Systems:


Review Of Systems: See Below


General: Reports: Weakness.  Denies: Fever, Chills, Malaise, Fatigue


HEENT: Reports: No Symptoms.  Denies: Dysphasia, Ear Pain, Eye Pain, Headaches, 

Hearing Changes, Rhinitis, Post Nasal Drip, Sinus Congestion, Sore Throat, 

Vertigo, Visual Changes


Pulmonary: Denies: Shortness of Breath


Cardiovascular: Denies: Chest Pain, Palpitations, Dyspnea on Exertion, Edema, 

Lightheadedness, Syncope, Claudication


Gastrointestinal: Reports: Flatus.  Denies: Abdominal Pain, Anorexia, 

Constipation, Diarrhea, Decreased Appetite, Difficulty Swallowing, Mucous in 

Stool, Nausea, Stool Incontinence, Vomiting


Genitourinary: Reports: Retention.  Denies: Dysuria, Frequency


Musculoskeletal: Reports: No Symptoms


Skin: Denies: Cyanosis, Jaundice, Mottled, Pallor, Diaphoresis, Bruising, 

Pruritis, Erythema, Wound, Change in Color


Psychiatric: Denies: Confusion, Depression, Mood Lability, Anxiety, Agitation, 

Cravings, Hallucinations, Homicidal Ideation, Hallucinations (Visual)


Neurological: Reports: Confusion (AMS), Weakness (Has baseline MS and DM ), 

Gait Disturbance


Hematologic/Lymphatic: Reports: No Symptoms


Immunologic: Reports: No Symptoms





Exam





- Exam


Exam: See Below





- Vital Signs


Vital Signs: 


 Last Vital Signs











Temp  36.2 C   02/22/18 07:35


 


Pulse  80   02/22/18 07:35


 


Resp  18   02/22/18 07:35


 


BP  108/59 L  02/22/18 07:35


 


Pulse Ox  94 L  02/22/18 07:35











Weight: 77.111 kg





- Exam


General: Alert, Cooperative


HEENT: Conjunctiva Clear, EACs Clear, EOMI, Hearing Intact, Mucosa Moist & Pink

, Nares Patent, Normal Nasal Septum, Posterior Pharynx Clear, Pupils Equal, 

Pupils Reactive, TMs Clear


Neck: Supple, Trachea Midline


Lungs: Clear to Auscultation, Normal Respiratory Effort


Cardiovascular: Regular Rate, Regular Rhythm


GI/Abdominal Exam: Normal Bowel Sounds, Soft, Non-Tender, No Organomegaly, No 

Distention, No Abnormal Bruit


 (Male) Exam: Deferred


Rectal (Males) Exam: Deferred


Back Exam: Normal Inspection, Decreased Range of Motion


Extremities: Normal Inspection, Normal Range of Motion, Non-Tender, No Pedal 

Edema, Normal Capillary Refill


Peripheral Pulses: 2+: Posterior Tibial (L), Posterior Tibial (R), Dorsalis 

Pedis (L), Dorsalis Pedis (R)


Skin: Warm, Dry, Intact


Neuro Extensive - Mental Status: Oriented x3, Normal Cognition, Memory Intact


Neuro Extensive - Motor, Sensory, Reflexes: CN II-XII Intact (limited but 

grossly intact), Abnormal Gait


Psychiatric: Alert, Normal Affect, Normal Mood





- Patient Data


Result Diagrams: 


 02/23/18 06:20





 02/23/18 06:20





*Q Meaningful Use (ADM)





- VTE *Q


VTE Criteria *Q: 








- Stroke *Q


Stroke Criteria *Q: 








- AMI *Q


AMI Criteria *Q: 





Problem List Initiated/Reviewed/Updated: Yes


Orders Last 24hrs: 


 Medication Orders





Acetaminophen (Tylenol)  650 mg PO Q4H PRN


   PRN Reason: Pain (Mild 1-3)/fever


Hydrocodone Bitart/Acetaminophen (Norco 325-5 Mg)  1 tab PO Q4H PRN


   PRN Reason: Pain (moderate 4-6)


Albuterol/Ipratropium (Duoneb 3.0-0.5 Mg/3 Ml)  3 ml NEB Q4H PRN


   PRN Reason: Shortness Of Breath/wheezing


Amoxicillin/Clavulanate Potassium (Augmentin 875 Mg/125 Mg)  1 tab PO Q12HR [facility]


Aspirin (Halfprin)  81 mg PO DAILY [facility]


Bisacodyl (Dulcolax)  5 mg PO DAILY PRN


   PRN Reason: Constipation


Bupropion HCl (Wellbutrin Xl)  300 mg PO DAILY [facility]


Docusate Sodium (Colace)  100 mg PO BID PRN


   PRN Reason: Constipation


Promethazine HCl 12.5 mg/ (Sodium Chloride)  50.5 mls @ 100 mls/hr IV Q6H PRN


   PRN Reason: Nausea/Vomiting


Insulin Detemir (Levemir)  22 unit SUBCUT BID [facility]


Lisinopril (Prinivil)  5 mg PO DAILY [facility]


Lorazepam (Ativan)  1 mg IV Q6H PRN


   PRN Reason: Nausea/Vomiting


Lorazepam (Ativan)  2 mg IVPUSH Q4H PRN


   PRN Reason: Seizures


Magnesium Sulfate (Pharmacy To Dose - Magnesium Replacement)  1 dose .XX 

ASDIRECTED [facility]


Metoprolol Tartrate (Lopressor)  5 mg IVPUSH Q4H PRN


   PRN Reason: Tachycardia


Morphine Sulfate (Morphine)  2 mg IVPUSH Q2H PRN


   PRN Reason: Pain (severe 7-10)


   Stop: 02/23/18 11:45


Ondansetron HCl (Zofran)  4 mg IV Q6H PRN


   PRN Reason: Nausea/Vomiting


Pantoprazole Sodium (Protonix Iv***)  40 mg IVPUSH ONETIME ONE


   Stop: 02/22/18 15:01


Polyethylene Glycol (Miralax)  17 gm PO DAILY PRN


   PRN Reason: Constipation


Potassium Chloride (Pharmacy To Dose - Potassium Replacement)  1 dose .XX 

ASDIRECTED [facility]


Senna/Docusate Sodium (Senna Plus)  1 tab PO BID PRN


   PRN Reason: Constipation


Simvastatin (Zocor)  10 mg PO DAILY [facility]


Temazepam (Restoril)  15 mg PO BEDTIME PRN


   PRN Reason: Sleep








Assessment/Plan Comment:: 


Assessment/Plan:


Acute:


Encephalopathy


   - 2/2 Toxic/Metabolic Encephalopathy vs "High on Marijuana"


   - Head CT scan report reveals no acute intra-cranial abnormality


   - Supportive Care


   - Treat underlying cause





Sinusitis


   - CT Scan: Opacified Left frontal and partially opacified right frontal sinus


   - Infection vs Mucocele vs Neoplasm


   - Risk Factors: MS, CLL and DM


   - He is mildly congested; no facial or maxillary tenderness on palpation; no 

gangrene or signs of necrosis


   - He is aseptic/afebrile


   - Afrin nasal BID for Decongestion


   - Nasal swab for culture


   - Augmentin 1 tab po Q12H 





Leukocytosis


   - WBC is 19.71---> likely Stress vs CLL (latent)


   - CK, CRP and ESR wnl





DM2 with Hyperglycemia


   - BS runs in the 300s


   - Not well controlled


   - A1C is 7.50





Medical Non-Compliance


   - Did not take his usual insulin as a result he developed hyperglycemia


   - He had DKA in the past   


   - Diabetic Education





Chronic:


HTN


HLD


COPD


Neurogenic Bladder, UA negative


DM2 with h/o DKA


CLL


Primary Progressive MS


Substance Abuse with Marijuana/Hashish





Plan:


Admit to the floor


Resume Home Meds


Routine AM Labs


UA negative 


UDS screening


PT/OT consult


Diabetic Education


SW/CM for d/c planning


Code status: 1





ED provider spoke to Neurogist in [place], was felt he has no MS exacerbation. 

Oncologist was consulted, offered medical treatment of abnormal CT scan 

findings. 





















































Chronic:

## 2018-02-23 VITALS — SYSTOLIC BLOOD PRESSURE: 111 MMHG | DIASTOLIC BLOOD PRESSURE: 54 MMHG

## 2018-02-23 RX ADMIN — AMOXICILLIN AND CLAVULANATE POTASSIUM SCH TAB: 875; 125 TABLET, FILM COATED ORAL at 09:47

## 2018-02-23 NOTE — [id number]
**Discharge Summary





- Hospital Course


Brief History: This is a 61 yo disabled white male with a past medical history 

of hypertension, hyperlipidemia, COPD, neurogenic bladder, primary progressive 

MS, type 2 diabetes, CLL and history of substance abuse who presents to the 

emergency department for evaluation of altered mental status. Patient is a poor 

historian and unable to provide pertinent information related to his history of 

present illness.





- Discharge Data


Discharge Date: 02/23/18


Discharge Disposition: Home, Self-Care 01


Condition: Good





- Discharge Diagnosis/Problem(s)


(1) Sinusitis chronic, frontal


SNOMED Code(s): 85950214


   ICD Code: J32.1 - CHRONIC FRONTAL SINUSITIS   Status: Acute   





(2) Leukocytosis


SNOMED Code(s): 757812487


   ICD Code: D72.829 - ELEVATED WHITE BLOOD CELL COUNT, UNSPECIFIED   Status: 

Acute   


Qualifiers: 


   Leukocytosis type: unspecified   Qualified Code(s): D72.829 - Elevated white 

blood cell count, unspecified   





(3) Medical non-compliance


SNOMED Code(s): 374027628


   ICD Code: Z91.19 - PATIENT'S NONCOMPLIANCE W OTH MEDICAL TREATMENT AND 

REGIMEN   Status: Acute   





(4) Marijuana abuse


SNOMED Code(s): 83666831


   ICD Code: F12.10 - CANNABIS ABUSE, UNCOMPLICATED   Status: Acute   





(5) Hyperglycemia due to type 2 diabetes mellitus


SNOMED Code(s): 947409582033772


   ICD Code: E11.65 - TYPE 2 DIABETES MELLITUS WITH HYPERGLYCEMIA   Status: 

Acute   


Qualifiers: 


   Diabetes mellitus long term insulin use: with long term use   Qualified Code(

s): E11.65 - Type 2 diabetes mellitus with hyperglycemia; Z79.4 - Long term (

current) use of insulin; Z79.4 - Long term (current) use of insulin; Z79.4 - 

Long term (current) use of insulin; Z79.4 - Long term (current) use of insulin 

  





(6) Dementia


SNOMED Code(s): 83081658


   ICD Code: F03.90 - UNSPECIFIED DEMENTIA WITHOUT BEHAVIORAL DISTURBANCE   

Status: Acute   


Qualifiers: 


   Dementia type: associated with other underlying disease   Dementia 

behavioral disturbance: without behavioral disturbance   Qualified Code(s): 

F02.80 - Dementia in other diseases classified elsewhere without behavioral 

disturbance   





- Patient Summary/Data


Operative Procedure(s) Performed: None


Complications: None


Consults: 


 Consultations





02/22/18 20:59


Consult to  [CONS] Routine 





02/22/18 21:21


Consult to Diabetes Educator [Consult to Diabetic Nurse Specialist] [CONS] 

Routine 











Labs Pending at D/C: 


None





Recommended Follow-up Testing/Procedures: 


None





Planned Operative Procedure(s) after DC: None


Hospital Course: 


Patient was primarily admitted for altered mental status. He carried a hx/o 

Primary Progressive MS, CLL, DM, Generalized Weakness and a Mild form of Memory 

Impairment. All diagnostic work up were all fairly benign with the exception a 

sinusitis finding on head CT scan.





However his UDS was positive for marijuana (THC). And after putting it 

altogether, we felt that his mental status change was due to him being "high on 

marijuana" and may not necessarily related to hyperglycemia.





Overnight, the patient had done well without any further issues. His wife was 

informed about his drug use and there after he was discharged back home. 

Patient was prescribed with a short course of antibiotic to complete his 

treatment for sinusitis. He was provided practical counseling about illicit 

drug use. And most importantly, he was counseled on the importance of diabetic 

medical and dietary compliance. 


  





- Patient Instructions


Diet: Usual Diet as Tolerated, Diabetic Diet


Activity: As Tolerated


Driving: Do Not Drive


Showering/Bathing: May Shower


Notify Provider of: Fever, Increased Pain, Nausea and/or Vomiting


Other/Special Instructions: - Please take new medications as directed.  - 

Continue all home medications.  - Recommend you avoid illicit drug use.  - 

Recommend repeat CBC with auto diff in 1 week through your PCP's office.  - It 

is important that you comply with dietary and medical treatment of your 

diabetes.  - Call or follow up for any questions or concerns after discharge.  

- Follow up with your doctor in 1 week





- Discharge Plan


Prescriptions/Med Rec: 


Amoxicillin/Clavulanate K [Augmentin 875-125 MG] 1 tab PO Q12HR #13 tablet


Saccharomyces Boulardii [Florastor] 250 mg PO BID #13 cap


Home Medications: 


 Home Meds





Aspirin [Halfprin] 1 tab PO DAILY 02/05/15 [History]


Simvastatin 10 mg PO DAILY 02/05/15 [History]


buPROPion [Wellbutrin XL] 300 mg PO DAILY 02/05/15 [History]


Insulin Aspart [NovoLOG] 0 units SUBCUT ASDIRECTED 02/12/16 [History]


Lisinopril [Zestril] 5 mg PO DAILY 02/12/16 [History]


Insulin Glarg,Human.Rec.Analog [LantUS Solostar] 44 units SUBCUT DAILY 02/22/18 

[History]


Amoxicillin/Clavulanate K [Augmentin 875-125 MG] 1 tab PO Q12HR #13 tablet 02/23 /18 [Rx]


Saccharomyces Boulardii [Florastor] 250 mg PO BID #13 cap 02/23/18 [Rx]








Patient Handouts:  Cannabis Use Disorder, Substance Use Disorder, Hyperglycemia

, Easy-to-Read


Referrals: 


Brittanie Kirk MD [Primary Care Provider] - 03/01/18 9:00 am (Please 

follow-up with Dr. Kirk on 03/01/18 at 0900)





- Discharge Summary/Plan Comment


DC Time >30 min.: Yes (45 mins)


Discharge Summary/Plan Comment: 


Discharge to Home








- General Info


Date of Service: 02/23/18


Admission Dx/Problem (Free Text: 


 Admission Diagnosis/Problem





Admission Diagnosis/Problem      Weakness








Subjective Update: 


Follow Up


 


Functional Status: Reports: Pain Controlled, Tolerating Diet, Urinating





- Review of Systems


General: Reports: Fever, Weakness, Fatigue, Malaise, Chills


HEENT: Denies: Contact Lenses


Pulmonary: Denies: Shortness of Breath, Pleuritic Chest Pain, Cough, Wheezing


Cardiovascular: Denies: Chest Pain


Gastrointestinal: Denies: Abdominal Pain, Nausea, Vomiting


Genitourinary: Reports: No Symptoms


Musculoskeletal: Reports: Other (baseline lower extremity weakness)


Skin: Reports: No Symptoms.  Denies: Cyanosis, Jaundice, Mottled, Pallor, 

Diaphoresis, Bruising, Pruritis, Rash


Neurological: Reports: Confusion (has baseline dementia due to early dementia), 

Weakness, Gait Disturbance, Other.  Denies: Dizziness, Headache, Numbness, Pre-

Existing Deficit, Seizure, Tremors, Difficulty Walking


Psychiatric: Denies: Mood Lability, Anxiety, Agitation, Hallucinations





- Patient Data


Vitals - Most Recent: 


 Last Vital Signs











Temp  36.5 C   02/23/18 07:46


 


Pulse  66   02/23/18 07:46


 


Resp  18   02/23/18 07:46


 


BP  107/68   02/23/18 09:47


 


Pulse Ox  95   02/23/18 07:46











Weight - Most Recent: 78.188 kg


I&O - Last 24 hours: 


 Intake & Output











 02/22/18 02/23/18 02/23/18





 22:59 06:59 14:59


 


Intake Total 340 200 420


 


Output Total  1200 


 


Balance 340 -1000 420











Lab Results - Last 24 hrs: 


 Laboratory Results - last 24 hr











  02/22/18 02/22/18 02/23/18 Range/Units





  17:59 20:50 06:06 


 


WBC     (4.23-9.07)  K/mm3


 


RBC     (4.63-6.08)  M/mm3


 


Hgb     (13.7-17.5)  gm/L


 


Hct     (40.1-51.0)  %


 


MCV     (79.0-92.2)  fl


 


MCH     (25.7-32.2)  pg


 


MCHC     (32.2-35.5)  g/dl


 


RDW Std Deviation     (35.1-43.9)  fL


 


Plt Count     (163-337)  K/mm3


 


MPV     (9.4-12.3)  fl


 


Neut % (Auto)     (34.0-67.9)  %


 


Lymph % (Auto)     (21.8-53.1)  %


 


Mono % (Auto)     (5.3-12.2)  %


 


Eos % (Auto)     (0.8-7.0)  


 


Baso % (Auto)     (0.1-1.2)  %


 


Neut # (Auto)     (1.78-5.38)  K/mm3


 


Lymph # (Auto)     (1.32-3.57)  K/mm3


 


Mono # (Auto)     (0.30-0.82)  K/mm3


 


Eos # (Auto)     (0.04-0.54)  K/mm3


 


Baso # (Auto)     (0.01-0.08)  K/mm3


 


Manual Slide Review     


 


ESR     (0-15)  mm/hr


 


Sodium     (136-145)  mEq/L


 


Potassium     (3.5-5.1)  mEq/L


 


Chloride     ()  mEq/L


 


Carbon Dioxide     (21-32)  mEq/L


 


Anion Gap     (5-15)  


 


BUN     (7-18)  mg/dL


 


Creatinine     (0.7-1.3)  mg/dL


 


Est Cr Clr Drug Dosing     mL/min


 


Estimated GFR (MDRD)     (>60)  mL/min


 


BUN/Creatinine Ratio     (14-18)  


 


Glucose     ()  mg/dL


 


POC Glucose  311 H  377 H  344 H  ()  mg/dL


 


Calcium     (8.5-10.1)  mg/dL


 


Magnesium     (1.8-2.4)  mg/dl


 


C-Reactive Protein     (<1.0)  mg/dL














  02/23/18 02/23/18 02/23/18 Range/Units





  06:20 06:20 06:20 


 


WBC  15.02 H    (4.23-9.07)  K/mm3


 


RBC  5.28    (4.63-6.08)  M/mm3


 


Hgb  15.1    (13.7-17.5)  gm/L


 


Hct  44.9    (40.1-51.0)  %


 


MCV  85.0    (79.0-92.2)  fl


 


MCH  28.6    (25.7-32.2)  pg


 


MCHC  33.6    (32.2-35.5)  g/dl


 


RDW Std Deviation  43.1    (35.1-43.9)  fL


 


Plt Count  241    (163-337)  K/mm3


 


MPV  11.7    (9.4-12.3)  fl


 


Neut % (Auto)  25.5 L    (34.0-67.9)  %


 


Lymph % (Auto)  67.2 H    (21.8-53.1)  %


 


Mono % (Auto)  5.6    (5.3-12.2)  %


 


Eos % (Auto)  1.1    (0.8-7.0)  


 


Baso % (Auto)  0.3    (0.1-1.2)  %


 


Neut # (Auto)  3.82    (1.78-5.38)  K/mm3


 


Lymph # (Auto)  10.10 H    (1.32-3.57)  K/mm3


 


Mono # (Auto)  0.84 H    (0.30-0.82)  K/mm3


 


Eos # (Auto)  0.17    (0.04-0.54)  K/mm3


 


Baso # (Auto)  0.04    (0.01-0.08)  K/mm3


 


Manual Slide Review  Abnormal smear    


 


ESR   9   (0-15)  mm/hr


 


Sodium    137  (136-145)  mEq/L


 


Potassium    4.4  (3.5-5.1)  mEq/L


 


Chloride    104  ()  mEq/L


 


Carbon Dioxide    24  (21-32)  mEq/L


 


Anion Gap    13.4  (5-15)  


 


BUN    21 H  (7-18)  mg/dL


 


Creatinine    1.1  (0.7-1.3)  mg/dL


 


Est Cr Clr Drug Dosing    67.36  mL/min


 


Estimated GFR (MDRD)    > 60  (>60)  mL/min


 


BUN/Creatinine Ratio    19.1 H  (14-18)  


 


Glucose    352 H  ()  mg/dL


 


POC Glucose     ()  mg/dL


 


Calcium    8.6  (8.5-10.1)  mg/dL


 


Magnesium    2.0  (1.8-2.4)  mg/dl


 


C-Reactive Protein    < 0.2  (<1.0)  mg/dL














  02/23/18 Range/Units





  11:22 


 


WBC   (4.23-9.07)  K/mm3


 


RBC   (4.63-6.08)  M/mm3


 


Hgb   (13.7-17.5)  gm/L


 


Hct   (40.1-51.0)  %


 


MCV   (79.0-92.2)  fl


 


MCH   (25.7-32.2)  pg


 


MCHC   (32.2-35.5)  g/dl


 


RDW Std Deviation   (35.1-43.9)  fL


 


Plt Count   (163-337)  K/mm3


 


MPV   (9.4-12.3)  fl


 


Neut % (Auto)   (34.0-67.9)  %


 


Lymph % (Auto)   (21.8-53.1)  %


 


Mono % (Auto)   (5.3-12.2)  %


 


Eos % (Auto)   (0.8-7.0)  


 


Baso % (Auto)   (0.1-1.2)  %


 


Neut # (Auto)   (1.78-5.38)  K/mm3


 


Lymph # (Auto)   (1.32-3.57)  K/mm3


 


Mono # (Auto)   (0.30-0.82)  K/mm3


 


Eos # (Auto)   (0.04-0.54)  K/mm3


 


Baso # (Auto)   (0.01-0.08)  K/mm3


 


Manual Slide Review   


 


ESR   (0-15)  mm/hr


 


Sodium   (136-145)  mEq/L


 


Potassium   (3.5-5.1)  mEq/L


 


Chloride   ()  mEq/L


 


Carbon Dioxide   (21-32)  mEq/L


 


Anion Gap   (5-15)  


 


BUN   (7-18)  mg/dL


 


Creatinine   (0.7-1.3)  mg/dL


 


Est Cr Clr Drug Dosing   mL/min


 


Estimated GFR (MDRD)   (>60)  mL/min


 


BUN/Creatinine Ratio   (14-18)  


 


Glucose   ()  mg/dL


 


POC Glucose  365 H  ()  mg/dL


 


Calcium   (8.5-10.1)  mg/dL


 


Magnesium   (1.8-2.4)  mg/dl


 


C-Reactive Protein   (<1.0)  mg/dL











Med Orders - Current: 


 Current Medications





Acetaminophen (Tylenol)  650 mg PO Q4H PRN


   PRN Reason: Pain (Mild 1-3)/fever


Hydrocodone Bitart/Acetaminophen (Norco 325-5 Mg)  1 tab PO Q4H PRN


   PRN Reason: Pain (moderate 4-6)


Albuterol/Ipratropium (Duoneb 3.0-0.5 Mg/3 Ml)  3 ml NEB Q4H PRN


   PRN Reason: Shortness Of Breath/wheezing


Amoxicillin/Clavulanate Potassium (Augmentin 875 Mg/125 Mg)  1 tab PO Q12HR [facility]


   Last Admin: 02/23/18 09:47 Dose:  1 tab


Aspirin (Halfprin)  81 mg PO DAILY [facility]


   Last Admin: 02/23/18 09:47 Dose:  81 mg


Bisacodyl (Dulcolax)  5 mg PO DAILY PRN


   PRN Reason: Constipation


Bupropion HCl (Wellbutrin Xl)  300 mg PO DAILY [facility]


   Last Admin: 02/23/18 09:47 Dose:  300 mg


Dextrose/Water (Dextrose 50% In Water)  50 ml IVPUSH ASDIRECTED PRN


   PRN Reason: Hypoglycemia


Docusate Sodium (Colace)  100 mg PO BID PRN


   PRN Reason: Constipation


Promethazine HCl 12.5 mg/ (Sodium Chloride)  50.5 mls @ 100 mls/hr IV Q6H PRN


   PRN Reason: Nausea/Vomiting


Insulin Aspart (Novolog)  0 unit SUBCUT QIDACANDBED [facility]


   PRN Reason: Protocol


   Last Admin: 02/23/18 12:15 Dose:  5 units


Insulin Detemir (Levemir)  22 unit SUBCUT BID [facility]


   Last Admin: 02/23/18 09:48 Dose:  22 units


Lisinopril (Prinivil)  5 mg PO DAILY [facility]


   Last Admin: 02/23/18 09:47 Dose:  5 mg


Lorazepam (Ativan)  1 mg IV Q6H PRN


   PRN Reason: Nausea/Vomiting


Lorazepam (Ativan)  2 mg IVPUSH Q4H PRN


   PRN Reason: Seizures


Magnesium Sulfate (Pharmacy To Dose - Magnesium Replacement)  1 dose .XX 

ASDIRECTED [facility]


Metoprolol Tartrate (Lopressor)  5 mg IVPUSH Q4H PRN


   PRN Reason: Tachycardia


Ondansetron HCl (Zofran)  4 mg IV Q6H PRN


   PRN Reason: Nausea/Vomiting


Oxymetazoline HCl (Afrin Original 0.05% Nasal Spray)  0 ml SOFÍA DAILY [facility]


   Stop: 02/26/18 09:00


   Last Admin: 02/23/18 09:46 Dose:  1 spr


Polyethylene Glycol (Miralax)  17 gm PO DAILY PRN


   PRN Reason: Constipation


Potassium Chloride (Pharmacy To Dose - Potassium Replacement)  1 dose .XX 

ASDIRECTED [facility]


Senna/Docusate Sodium (Senna Plus)  1 tab PO BID PRN


   PRN Reason: Constipation


Simvastatin (Zocor)  10 mg PO DAILY [facility]


   Last Admin: 02/23/18 09:47 Dose:  10 mg


Temazepam (Restoril)  15 mg PO BEDTIME PRN


   PRN Reason: Sleep





Discontinued Medications





Amoxicillin/Clavulanate Potassium (Augmentin 875 Mg/125 Mg)  1 tab PO ONETIME 

[facility]


   Stop: 02/22/18 11:19


   Last Admin: 02/22/18 11:37 Dose:  1 tab


Magnesium Sulfate 2 gm/ Premix  50 mls @ 25 mls/hr IV ASDIRECTED PRN


   PRN Reason: Other


Insulin Human Regular (Humulin R)  10 unit SUBCUT ONETIME [facility]


   Stop: 02/22/18 11:38


   Last Admin: 02/22/18 11:59 Dose:  10 unit


Morphine Sulfate (Morphine)  2 mg IVPUSH Q2H PRN


   PRN Reason: Pain (severe 7-10)


   Stop: 02/23/18 11:45


Pantoprazole Sodium (Protonix Iv***)  40 mg IVPUSH ONETIME ONE


   Stop: 02/22/18 15:01


   Last Admin: 02/22/18 16:09 Dose:  40 mg











- Exam


General: Reports: Alert, Oriented, Cooperative, No Acute Distress


HEENT: Reports: Pupils Equal, Pupils Reactive, EOMI, Mucous Membr. Moist/Pink


Neck: Reports: Supple, Trachea Midline, No JVD


Lungs: Reports: Clear to Auscultation, Normal Respiratory Effort


Cardiovascular: Reports: Regular Rate, Regular Rhythm


GI/Abdominal Exam: Normal Bowel Sounds, Soft, Non-Tender, No Organomegaly, No 

Distention, No Abnormal Bruit, No Mass


 (Male) Exam: Deferred


Rectal (Males) Exam: Deferred


Back Exam: Reports: Normal Inspection, Decreased Range of Motion


Extremities: Normal Inspection, Normal Range of Motion, Non-Tender, No Pedal 

Edema, Normal Capillary Refill


Skin: Reports: Warm, Dry, Intact


Neurological: Reports: No New Focal Deficit (fairly intact).  Denies: Normal 

Gait, Normal Tone, Strength Equal Bilateral


Psy/Mental Status: Reports: Alert, Normal Affect, Normal Mood





*Q Meaningful Use (DIS)





- VTE *Q


VTE Criteria *Q: 








- Stroke *Q


Stroke Criteria *Q: 








- AMI *Q


AMI Criteria *Q:

## 2020-02-02 ENCOUNTER — HOSPITAL ENCOUNTER (EMERGENCY)
Dept: HOSPITAL 41 - JD.ED | Age: 65
Discharge: NURSING, REHAB OR LONG-TERM CARE | End: 2020-02-02
Payer: MEDICARE

## 2020-02-02 VITALS — HEART RATE: 77 BPM | SYSTOLIC BLOOD PRESSURE: 125 MMHG | DIASTOLIC BLOOD PRESSURE: 73 MMHG

## 2020-02-02 DIAGNOSIS — I10: ICD-10-CM

## 2020-02-02 DIAGNOSIS — J93.83: Primary | ICD-10-CM

## 2020-02-02 DIAGNOSIS — G89.18: ICD-10-CM

## 2020-02-02 DIAGNOSIS — M54.6: ICD-10-CM

## 2020-02-02 DIAGNOSIS — Z79.4: ICD-10-CM

## 2020-02-02 DIAGNOSIS — Z85.118: ICD-10-CM

## 2020-02-02 DIAGNOSIS — Z85.6: ICD-10-CM

## 2020-02-02 DIAGNOSIS — Z79.899: ICD-10-CM

## 2020-02-02 DIAGNOSIS — J44.9: ICD-10-CM

## 2020-02-02 DIAGNOSIS — E11.9: ICD-10-CM

## 2020-02-02 DIAGNOSIS — N39.0: ICD-10-CM

## 2020-02-02 DIAGNOSIS — Z87.891: ICD-10-CM

## 2020-02-02 DIAGNOSIS — E78.00: ICD-10-CM

## 2020-02-02 DIAGNOSIS — Z79.82: ICD-10-CM

## 2020-02-02 PROCEDURE — 87186 SC STD MICRODIL/AGAR DIL: CPT

## 2020-02-02 PROCEDURE — 36415 COLL VENOUS BLD VENIPUNCTURE: CPT

## 2020-02-02 PROCEDURE — 96365 THER/PROPH/DIAG IV INF INIT: CPT

## 2020-02-02 PROCEDURE — 82962 GLUCOSE BLOOD TEST: CPT

## 2020-02-02 PROCEDURE — 81001 URINALYSIS AUTO W/SCOPE: CPT

## 2020-02-02 PROCEDURE — 71045 X-RAY EXAM CHEST 1 VIEW: CPT

## 2020-02-02 PROCEDURE — 96375 TX/PRO/DX INJ NEW DRUG ADDON: CPT

## 2020-02-02 PROCEDURE — 85025 COMPLETE CBC W/AUTO DIFF WBC: CPT

## 2020-02-02 PROCEDURE — 87086 URINE CULTURE/COLONY COUNT: CPT

## 2020-02-02 PROCEDURE — 80053 COMPREHEN METABOLIC PANEL: CPT

## 2020-02-02 PROCEDURE — 99285 EMERGENCY DEPT VISIT HI MDM: CPT

## 2020-02-02 NOTE — CR
Chest: Portable view of the chest was obtained.

 

Comparison: Prior chest x-ray of 02/20/18.

 

Pleural air is noted over the right lung apex and within the right 

lung base.  This involves approximately 15% of the lung.  Lungs are 

clear with no acute parenchymal change.  Heart size and mediastinum 

are normal.  Bony structures are unremarkable.

 

Impression:

1.  Right-sided pneumothorax as noted above.

 

Diagnostic code #3

 

Study was dictated in [place] [employer] Time

## 2020-02-02 NOTE — EDM.PDOC
ED HPI GENERAL MEDICAL PROBLEM





- General


Chief Complaint: Skin Complaint


Stated Complaint: BLEEDING POST LUNG REMOVAL SURGERY ON 01/22/20


Time Seen by Provider: 02/02/20 19:12


Source of Information: Reports: Patient, Family


History Limitations: Reports: No Limitations





- History of Present Illness


INITIAL COMMENTS - FREE TEXT/NARRATIVE: 





Patient is a 64-year-old male who presents with his wife with complaints of 

right sided thoracic pain, drainage from an incision as well as frequency and 

burning with urination.  Patient recently had a right lung resection on 20 January.  He was discharged from [facility] this last Tuesday.  Wife 

states since that time he has been in "horrible pain ".  He was discharged home 

with Norco 5 mg / 325 mg 1 tab every 4 hours for pain.  Wife states he has not 

slept since Tuesday because of the pain.  He is also complaining of frequency 

and burning with urination.  They did state he had a urinary catheter for an 

extended period of time while he was in the hospital.  This morning after 

showering one of his incisions started draining serosanguineous fluid.  Patient 

denies any fever, chills, or shortness of breath at rest; however, he does 

become SOB with exertion.  Patient's wife is asking if he can be admitted to 

the hospital as she feels he was discharged too soon.





- Related Data


 Allergies











Allergy/AdvReac Type Severity Reaction Status Date / Time


 


No Known Allergies Allergy   Verified 02/02/20 18:28











Home Meds: 


 Home Meds





Aspirin [Halfprin] 1 tab PO DAILY 02/05/15 [History]


Simvastatin 10 mg PO DAILY 02/05/15 [History]


buPROPion [Wellbutrin XL] 300 mg PO DAILY 02/05/15 [History]


Insulin Aspart [NovoLOG] 0 units SUBCUT ASDIRECTED 02/12/16 [History]


Lisinopril [Zestril] 5 mg PO DAILY 02/12/16 [History]


Insulin Glarg,Human.Rec.Analog [LantUS Solostar] 45 units SUBCUT DAILY 02/22/18 

[History]











Past Medical History


Cardiovascular History: Reports: High Cholesterol, Hypertension


Respiratory History: Reports: COPD


Genitourinary History: Reports: Neurogenic Bladder


Other Genitourinary History: difficulty with urination.


Musculoskeletal History: Reports: Other (See Below)


Other Musculoskeletal History: MS


Neurological History: Reports: MS


Endocrine/Metabolic History: Reports: Diabetes, Type II


Other Endocrine/Metabolic History: checks blood sugar 2-3 times a day at home


Oncologic (Cancer) History: Reports: Leukemia, Lung


Other Oncologic History: CLL





- Infectious Disease History


Infectious Disease History: Reports: Chicken Pox





- Past Surgical History


Respiratory Surgical History: Reports: Lung Resection


Other Respiratory Surgeries/Procedures: 1/3 of right lower lobe on 1-22-20


GI Surgical History: Reports: Appendectomy


Musculoskeletal Surgical History: Reports: None





Social & Family History





- Family History


Family Medical History: Noncontributory





- Tobacco Use


Smoking Status *Q: Former Smoker


Used Tobacco, but Quit: Yes


Month/Year Tobacco Last Used: 10/2019





- Caffeine Use


Caffeine Use: Reports: Coffee, Soda, Tea





- Recreational Drug Use


Recreational Drug Use: Yes


Recreational Drug Type: Reports: Marijuana/Hashish


Recreational Drug Use Frequency: Weekly





- Living Situation & Occupation


Living situation: Reports: , with Spouse


Occupation: Disabled





ED ROS GENERAL





- Review of Systems


Review Of Systems: Comprehensive ROS is negative, except as noted in HPI.





ED EXAM, SKIN/RASH


Exam: See Below


Exam Limited By: No Limitations


General Appearance: Alert, WD/WN, No Apparent Distress


Head: Atraumatic, Normocephalic


Respiratory/Chest: No Respiratory Distress, Lungs Clear, No Accessory Muscle Use

, Decreased Breath Sounds (rt side throughout), Other (Right sided chest 

tenderness. Large, well approximated thoracotomy incision to the rt posterior 

thorax.  No redness or drainage noted. Two 2cm incisions each closed with 1 

suture to the right lateral thorax.  1 of the incisions is draining 

serosanguinous fluid. No errythme present.)


Cardiovascular: Normal Peripheral Pulses, Regular Rate, Rhythm, No Edema, No 

Gallop, No JVD, No Murmur, No Rub


GI/Abdominal: Normal Bowel Sounds, Soft, Non-Tender, No Organomegaly, No 

Distention, No Abnormal Bruit, No Mass


Neurological: Alert, Oriented, CN II-XII Intact, Normal Cognition, Normal Gait, 

Normal Reflexes, No Motor/Sensory Deficits


Psychiatric: Normal Affect, Normal Mood


Skin: Warm, Dry, Intact, Normal Color, No Rash





Course





- Vital Signs


Last Recorded V/S: 


 Last Vital Signs











Temp  98.4 F   02/02/20 21:15


 


Pulse  77   02/02/20 21:15


 


Resp  16   02/02/20 21:15


 


BP  125/73   02/02/20 21:15


 


Pulse Ox  95   02/02/20 21:15














- Orders/Labs/Meds


Labs: 


 Laboratory Tests











  02/02/20 02/02/20 02/02/20 Range/Units





  19:35 19:43 20:06 


 


WBC    21.41 H  (4.23-9.07)  K/mm3


 


RBC    4.94  (4.63-6.08)  M/mm3


 


Hgb    14.4  (13.7-17.5)  gm/dl


 


Hct    43.5  (40.1-51.0)  %


 


MCV    88.1  D  (79.0-92.2)  fl


 


MCH    29.1  (25.7-32.2)  pg


 


MCHC    33.1  (32.2-35.5)  g/dl


 


RDW Std Deviation    43.8  (35.1-43.9)  fL


 


Plt Count    371 H D  (163-337)  K/mm3


 


MPV    11.3  (9.4-12.3)  fl


 


Neut % (Auto)    82.2 H  (34.0-67.9)  %


 


Lymph % (Auto)    6.0 L  (21.8-53.1)  %


 


Mono % (Auto)    10.2  (5.3-12.2)  %


 


Eos % (Auto)    0.3 L  (0.8-7.0)  


 


Baso % (Auto)    0.1  (0.1-1.2)  %


 


Neut # (Auto)    17.59 H  (1.78-5.38)  K/mm3


 


Lymph # (Auto)    1.28 L  (1.32-3.57)  K/mm3


 


Mono # (Auto)    2.19 H  (0.30-0.82)  K/mm3


 


Eos # (Auto)    0.07  (0.04-0.54)  K/mm3


 


Baso # (Auto)    0.03  (0.01-0.08)  K/mm3


 


Manual Slide Review    Abnormal smear  


 


Sodium     (136-145)  mEq/L


 


Potassium     (3.5-5.1)  mEq/L


 


Chloride     ()  mEq/L


 


Carbon Dioxide     (21-32)  mEq/L


 


Anion Gap     (5-15)  


 


BUN     (7-18)  mg/dL


 


Creatinine     (0.7-1.3)  mg/dL


 


Est Cr Clr Drug Dosing     mL/min


 


Estimated GFR (MDRD)     (>60)  mL/min


 


BUN/Creatinine Ratio     (14-18)  


 


Glucose     ()  mg/dL


 


POC Glucose   342 H   ()  mg/dL


 


Calcium     (8.5-10.1)  mg/dL


 


Total Bilirubin     (0.2-1.0)  mg/dL


 


AST     (15-37)  U/L


 


ALT     (16-63)  U/L


 


Alkaline Phosphatase     ()  U/L


 


Total Protein     (6.4-8.2)  g/dl


 


Albumin     (3.4-5.0)  g/dl


 


Globulin     gm/dL


 


Albumin/Globulin Ratio     (1-2)  


 


Urine Color  Yellow    (Yellow)  


 


Urine Appearance  Clear    (Clear)  


 


Urine pH  5.5    (5.0-8.0)  


 


Ur Specific Gravity  1.025    (1.005-1.030)  


 


Urine Protein  2+ H    (Negative)  


 


Urine Glucose (UA)  3+ H    (Negative)  


 


Urine Ketones  1+ H    (Negative)  


 


Urine Occult Blood  3+ H    (Negative)  


 


Urine Nitrite  Positive H    (Negative)  


 


Urine Bilirubin  Negative    (Negative)  


 


Urine Urobilinogen  4.0 H    (0.2-1.0)  


 


Ur Leukocyte Esterase  Trace H    (Negative)  


 


Urine RBC  10-20 H    (0-5)  /hpf


 


Urine WBC  30-40 H    (0-5)  /hpf


 


Ur Squamous Epith Cells  0-5    (0-5)  /hpf


 


Urine Bacteria  Many H    (FEW)  /hpf


 


Urine Mucus  Few    (FEW)  /hpf














  02/02/20 Range/Units





  20:06 


 


WBC   (4.23-9.07)  K/mm3


 


RBC   (4.63-6.08)  M/mm3


 


Hgb   (13.7-17.5)  gm/dl


 


Hct   (40.1-51.0)  %


 


MCV   (79.0-92.2)  fl


 


MCH   (25.7-32.2)  pg


 


MCHC   (32.2-35.5)  g/dl


 


RDW Std Deviation   (35.1-43.9)  fL


 


Plt Count   (163-337)  K/mm3


 


MPV   (9.4-12.3)  fl


 


Neut % (Auto)   (34.0-67.9)  %


 


Lymph % (Auto)   (21.8-53.1)  %


 


Mono % (Auto)   (5.3-12.2)  %


 


Eos % (Auto)   (0.8-7.0)  


 


Baso % (Auto)   (0.1-1.2)  %


 


Neut # (Auto)   (1.78-5.38)  K/mm3


 


Lymph # (Auto)   (1.32-3.57)  K/mm3


 


Mono # (Auto)   (0.30-0.82)  K/mm3


 


Eos # (Auto)   (0.04-0.54)  K/mm3


 


Baso # (Auto)   (0.01-0.08)  K/mm3


 


Manual Slide Review   


 


Sodium  132 L  (136-145)  mEq/L


 


Potassium  4.7  (3.5-5.1)  mEq/L


 


Chloride  98  ()  mEq/L


 


Carbon Dioxide  25  (21-32)  mEq/L


 


Anion Gap  13.7  (5-15)  


 


BUN  23 H  (7-18)  mg/dL


 


Creatinine  1.2  (0.7-1.3)  mg/dL


 


Est Cr Clr Drug Dosing  60.17  mL/min


 


Estimated GFR (MDRD)  > 60  (>60)  mL/min


 


BUN/Creatinine Ratio  19.2 H  (14-18)  


 


Glucose  345 H  ()  mg/dL


 


POC Glucose   ()  mg/dL


 


Calcium  9.1  (8.5-10.1)  mg/dL


 


Total Bilirubin  0.6  (0.2-1.0)  mg/dL


 


AST  33  (15-37)  U/L


 


ALT  78 H  (16-63)  U/L


 


Alkaline Phosphatase  282 H  ()  U/L


 


Total Protein  7.3  (6.4-8.2)  g/dl


 


Albumin  2.8 L  (3.4-5.0)  g/dl


 


Globulin  4.5  gm/dL


 


Albumin/Globulin Ratio  0.6 L  (1-2)  


 


Urine Color   (Yellow)  


 


Urine Appearance   (Clear)  


 


Urine pH   (5.0-8.0)  


 


Ur Specific Gravity   (1.005-1.030)  


 


Urine Protein   (Negative)  


 


Urine Glucose (UA)   (Negative)  


 


Urine Ketones   (Negative)  


 


Urine Occult Blood   (Negative)  


 


Urine Nitrite   (Negative)  


 


Urine Bilirubin   (Negative)  


 


Urine Urobilinogen   (0.2-1.0)  


 


Ur Leukocyte Esterase   (Negative)  


 


Urine RBC   (0-5)  /hpf


 


Urine WBC   (0-5)  /hpf


 


Ur Squamous Epith Cells   (0-5)  /hpf


 


Urine Bacteria   (FEW)  /hpf


 


Urine Mucus   (FEW)  /hpf











Meds: 


Medications














Discontinued Medications














Generic Name Dose Route Start Last Admin





  Trade Name Skip  PRN Reason Stop Dose Admin


 


Hydromorphone HCl  0.5 mg  02/02/20 21:51  02/02/20 21:58





  Dilaudid  IVPUSH  02/02/20 21:52  0.5 mg





  ONETIME ONE   Administration





     





     





     





     


 


Ceftriaxone Sodium 1 gm/  100 mls @ 200 mls/hr  02/02/20 20:34  02/02/20 21:07





  Sodium Chloride  IV  02/02/20 21:03  200 mls/hr





  ONETIME ONE   Administration





     





     





     





     


 


Sodium Chloride  1,000 mls @ 999 mls/hr  02/02/20 21:30  02/02/20 21:56





  Normal Saline  IV   999 mls/hr





  ASDIRECTED MARTHA   Administration





     





     





     





     


 


Insulin Human Lispro  8 unit  02/02/20 20:06  02/02/20 20:23





  Humalog  SUBCUT  02/02/20 20:07  8 unit





  ONETIME ONE   Administration





     





     





     





     














- Re-Assessments/Exams


Free Text/Narrative Re-Assessment/Exam: 





02/02/20 20:15


RN notified this provider that patient's blood sugar is elevated at 342.  

Patient normally takes 8 units of NovoLog at suppertime, however he did not 

take this tonight.  We will give him his home dose of insulin at this time.





02/02/20 21:17


Hematology was significant for WBC of 21.41, platelets 371, neutrophils 82.2%, 

lymphocytes 6.0%, total neutrophils 17.59, sodium 132, glucose 345, ALT 78, alk 

phos 282.  Urinalysis is significantly positive for urinary tract infection.  I 

have ordered Rocephin 1 g IV to be given.  I will also give him a 500 mill 

bolus of normal saline followed by an infusion at 150 mils per hour.  Chest x-

ray does show pleural air over the right lung apex and within the right lung 

base right-sided pneumothorax involving approximately 15% of the lung.  Patient'

s SPO2 is been between 90 and 95% on room air while here.  I did receive 

records from [facility] and no pneumothorax was noted on his last chest x-

ray on 27 January.  I will contact [facility] to discuss possible 

transfer.





02/02/20 21:40


Spoke with Dr. Colin, cardiothoracic surgeon, at [facility].  He 

recommended that we do not insert a chest tube at this time.  He will evaluate 

the patient when he arrives.  ER physician, Dr. De Santiago, accepted pt for 

transfer.  Pt will go through the ER.











Departure





- Departure


Time of Disposition: 21:45


Disposition: DC/Tfer to Acute Hospital 02


Condition: Fair


Clinical Impression: 


 Acute pneumothorax, Post-op pain





UTI (urinary tract infection)


Qualifiers:


 Urinary tract infection type: site unspecified Hematuria presence: with 

hematuria Qualified Code(s): N39.0 - Urinary tract infection, site not specified








- Discharge Information


Referrals: 


Brittanie Kirk MD [Primary Care Provider] - 


Forms:  ED Department Discharge





Sepsis Event Note





- Evaluation


Sepsis Screening Result: No Definite Risk





- Focused Exam


Vital Signs: 


 Vital Signs











  Temp Pulse Resp BP Pulse Ox


 


 02/02/20 21:15  98.4 F  77  16  125/73  95


 


 02/02/20 18:24  99.6 F  88  16  124/73  91 L











Date Exam was Performed: 02/02/20


Time Exam was Performed: 22:49

## 2020-05-14 ENCOUNTER — HOSPITAL ENCOUNTER (EMERGENCY)
Dept: HOSPITAL 41 - JD.ED | Age: 65
Discharge: HOME | End: 2020-05-14
Payer: MEDICARE

## 2020-05-14 VITALS — HEART RATE: 63 BPM | DIASTOLIC BLOOD PRESSURE: 82 MMHG | SYSTOLIC BLOOD PRESSURE: 163 MMHG

## 2020-05-14 DIAGNOSIS — I10: ICD-10-CM

## 2020-05-14 DIAGNOSIS — E11.649: Primary | ICD-10-CM

## 2020-05-14 DIAGNOSIS — Z79.82: ICD-10-CM

## 2020-05-14 DIAGNOSIS — G35: ICD-10-CM

## 2020-05-14 DIAGNOSIS — E78.00: ICD-10-CM

## 2020-05-14 DIAGNOSIS — J44.9: ICD-10-CM

## 2020-05-14 DIAGNOSIS — Z79.899: ICD-10-CM

## 2020-05-14 DIAGNOSIS — Z79.4: ICD-10-CM

## 2020-05-14 NOTE — EDM.PDOC
ED HPI GENERAL MEDICAL PROBLEM





- General


Chief Complaint: Diabetic Complaint


Stated Complaint: [facility]


Time Seen by Provider: 05/14/20 17:50


Source of Information: Reports: Patient, EMS


History Limitations: Reports: Altered Mental Status





- History of Present Illness


INITIAL COMMENTS - FREE TEXT/NARRATIVE: 





64-year-old male who is a known insulin-dependent diabetic presents to the ED 

per [facility].  Apparently he suffered an insulin reaction at home 

tonight.  Unclear what dosage she took what form of insulin took and when he 

took it.  When the paramedics arrived his blood sugar was down to 35 and he was 

very combative.  They were able to start an IV and give him an amp of D50 and 

on recheck his blood sugar came up to 283 and he responded with return to near 

normal cognitive function.  His upper  T shirt is very wet from sweating and 

drooling complaining of chills.  Dates he usually takes his Lantus 45 units 

subcutaneously once daily every morning.  He rarely if ever takes Humalog with 

any of his meals.  He is not sure if he missed any meals today or did not eat 

per normal.  He also cannot report to me if he was exercising or doing more 

manual labor today to use up his sugar to precipitate a significant 

hypoglycemic reaction.  Sugar now is 135


Onset: Today


Onset Date: 05/14/20


Onset Time: 17:25 (Apparently family tried at home for about 1/2-hour to get 

him to take sugar in but did not succeed)


Duration: Minutes:


Location: Reports: Generalized (Generalized sense of confusion with some 

agitation and behavioral changes secondary to hypoglycemia.)


Quality: Reports: Other (Felt a insulin reaction with a blood sugar down to 35)


Severity: Moderate


Improves with: Reports: Other (He has improved with return of normal cognitive 

function although his memory is somewhat impaired after receiving an amp of 

dextrose administered by the paramedics they checked blood sugar was up to 283.)


Worsens with: Reports: None


Context: Reports: Other (An insulin reaction which is difficult to ascertain 

the reason for this point time.  He believes it is perhaps due to other 

medications that he is taking although I cannot).  Denies: Activity, Exercise, 

Lifting, Sick Contact, Trauma


Associated Symptoms: Reports: Confusion, Malaise, Weakness.  Denies: Chest Pain

, Cough, cough w sputum, Diaphoresis, Fever/Chills, Headaches, Loss of Appetite

, Nausea/Vomiting, Rash, Seizure, Shortness of Breath, Syncope


Treatments PTA: Reports: Other (see below) (.)





- Related Data


 Allergies











Allergy/AdvReac Type Severity Reaction Status Date / Time


 


No Known Allergies Allergy   Verified 02/02/20 18:28











Home Meds: 


 Home Meds





Aspirin [Halfprin] 1 tab PO DAILY 02/05/15 [History]


Simvastatin 10 mg PO DAILY 02/05/15 [History]


buPROPion [Wellbutrin XL] 300 mg PO DAILY 02/05/15 [History]


Insulin Aspart [NovoLOG] 0 units SUBCUT ASDIRECTED 02/12/16 [History]


lisinopriL [Zestril] 5 mg PO DAILY 02/12/16 [History]


Insulin Glarg,Human.Rec.Analog [LantUS Solostar] 45 units SUBCUT DAILY 02/22/18 

[History]











Past Medical History


Cardiovascular History: Reports: High Cholesterol, Hypertension


Respiratory History: Reports: COPD


Genitourinary History: Reports: Neurogenic Bladder


Other Genitourinary History: difficulty with urination.


Musculoskeletal History: Reports: Other (See Below)


Other Musculoskeletal History: MS


Neurological History: Reports: MS


Endocrine/Metabolic History: Reports: Diabetes, Type II


Other Endocrine/Metabolic History: checks blood sugar 2-3 times a day at home


Oncologic (Cancer) History: Reports: Leukemia, Lung (Currently had the lower 

lung resected in February of this year because of cancer.  So far no recurrence 

identified)


Other Oncologic History: CLL





- Infectious Disease History


Infectious Disease History: Reports: Chicken Pox





- Past Surgical History


Respiratory Surgical History: Reports: Lung Resection


Other Respiratory Surgeries/Procedures: 1/3 of right lower lobe on 1-22-20


GI Surgical History: Reports: Appendectomy


Musculoskeletal Surgical History: Reports: None





Social & Family History





- Family History


Family Medical History: Noncontributory





- Caffeine Use


Caffeine Use: Reports: Coffee, Soda, Tea





- Living Situation & Occupation


Living situation: Reports: , with Spouse


Occupation: Disabled





ED ROS GENERAL





- Review of Systems


Review Of Systems: See Below


Constitutional: Reports: Malaise, Weakness, Fatigue.  Denies: Fever, Chills


HEENT: Reports: Glasses (Eating), Vision Change


Respiratory: Reports: No Symptoms, Other (To have lung cancer diagnosed in 

January of this year with right lower lobe resection February 2.)


Cardiovascular: Reports: Blood Pressure Problem, Dyspnea on Exertion.  Denies: 

Claudication, Edema, Lightheadedness, Orthopnea


Endocrine: Reports: Fatigue (Occasional)


GI/Abdominal: Reports: No Symptoms


: Reports: Frequency


Musculoskeletal: Reports: Neck Pain (Hips and low back at times), Joint Pain


Skin: Reports: No Symptoms (.)


Neurological: Reports: Confusion (Low blood sugar today.).  Denies: Seizure


Psychiatric: Reports: Depression


Hematologic/Lymphatic: Reports: No Symptoms


Immunologic: Reports: No Symptoms





ED EXAM GENERAL NO PERIP PULSE





- Physical Exam


Exam: See Below


Exam Limited By: Altered Mental Status (Cold and chilled.)


General Appearance: Anxious, Lethargic, Moderate Distress (Just about having to 

be here by ambulance.  Apparently was quite agitated at home before receiving 

an amp of D50.)


Eye Exam: Bilateral Eye: Normal Inspection, PERRL


Throat/Mouth: Normal Inspection, Normal Lips, Normal Oropharynx


Head: Atraumatic, Normocephalic


Neck: Normal Inspection, Supple, Non-Tender, Full Range of Motion.  No: Carotid 

Bruit, Lymphadenopathy (L), Lymphadenopathy (R)


Respiratory/Chest: Lungs Clear, Decreased Breath Sounds (Decreased breath 

sounds right lung field.), Other (Sided thoracotomy scar.).  No: Respiratory 

Distress


Cardiovascular: Normal Peripheral Pulses, Regular Rate, Rhythm, No Edema, No 

Gallop, No Murmur, No Rub


GI/Abdominal: Normal Bowel Sounds, Soft, Non-Tender, No Organomegaly, No 

Abnormal Bruit, No Mass, Pelvis Stable


Back Exam: Normal Inspection


Extremities: Normal Inspection, Normal Range of Motion, Non-Tender, No Pedal 

Edema


Neurological: Alert, Oriented, CN II-XII Intact, No Motor/Sensory Deficits


Psychiatric: Anxious, Other (Abrupt and betty. )


Skin Exam: Dry, Cool, Other (His anterior T shirt was quite wet from drooling 

and)





Course





- Vital Signs


Last Recorded V/S: 


 Last Vital Signs











Temp  35.1 C L  05/14/20 18:04


 


Pulse  63   05/14/20 18:04


 


Resp  18   05/14/20 18:04


 


BP  163/82 H  05/14/20 18:04


 


Pulse Ox  96   05/14/20 18:04














- Orders/Labs/Meds


Orders: 


 Active Orders 24 hr











 Category Date Time Status


 


 Blood Glucose Check, Bedside [RC] ONETIME Care  05/14/20 17:51 Active


 


 Blood Glucose Check, Bedside [RC] ONETIME Care  05/14/20 18:52 Active


 


 Dextrose 5%-0.9% NaCl [Dextrose 5%-Normal Saline] 1,000 Med  05/14/20 18:00 

Active





 ml   





 IV ASDIRECTED   








 Medication Orders





Dextrose/Sodium Chloride (Dextrose 5%-Normal Saline)  1,000 mls @ 150 mls/hr IV 

ASDIRECTED [facility]


   Last Admin: 05/14/20 18:22  Dose: 150 mls/hr








Labs: 


 Laboratory Tests











  05/14/20 05/14/20 05/14/20 Range/Units





  18:12 18:50 19:33 


 


POC Glucose  135 H  153 H  236 H  ()  mg/dL











Meds: 


Medications











Generic Name Dose Route Start Last Admin





  Trade Name Skip  PRN Reason Stop Dose Admin


 


Dextrose/Sodium Chloride  1,000 mls @ 150 mls/hr  05/14/20 18:00  05/14/20 18:22





  Dextrose 5%-Normal Saline  IV   150 mls/hr





  ASDIRECTED [facility]   Administration





     





     





     





     














- Radiology Interpretation


Free Text/Narrative:: 


64-year-old male who is known to be an insulin-dependent type 2 diabetic 

presents to the ED with an insulin reaction.  Family members a son and daughter 

are at home when he started to experience confusion and disorientation.  When 

paramedics arrived his blood sugar was 35.  Apparently the family numbers have 

been trying to get him to take supplements of sugar base carbohydrate is 

without much luck.  Agitated and quite uncooperative with paramedics.  They 

were able to establish an IV and give him an amp of D50.  On recheck his blood 

sugar went up to 283 within 10 minutes.  He was there and transported to the 

ED. he suggest that he takes Lantus insulin 45 units subcutaneously once daily 

every morning.  I could not identify by history why his blood sugar went to 35.

  He is not sure if he ate a normal meal at dinnertime or whether he did any 

manual labor this afternoon that would have caused him to have an insulin 

reaction.  Blood sugar at the bedside in the ED was 135.  It will be checked 

every 1/2 hour.  He will be started on a liter of D5 normal saline at 150 mils 

per hour.  I get him a supper meal.





- Re-Assessments/Exams


Free Text/Narrative Re-Assessment/Exam: 





05/14/20 18:57 and is eating some supper mostly mashed potatoes.  Blood Sugar 

is 153.  It will be rechecked in 1/2-hour and if it is good he will be 

discharged to home.





05/14/20 19:09 Patient did eat supper.  His blood sugar is good and a half an 

hour he will be sent home.  He does comment to me now that he does take Humalog 

insulin usually 7 units with breakfast 7 units with dinner and 8 units with 

supper.  He cannot member whether he took his insulin before supper tonight and 

did not get to eat.  Is likely the cause of his hypoglycemic reaction.








Departure





- Departure


Time of Disposition: 19:46


Disposition: Home, Self-Care 01


Condition: Fair


Clinical Impression: 


 Hypoglycemic reaction to insulin in type 2 diabetes mellitus, Hypoglycemia








- Discharge Information


*PRESCRIPTION DRUG MONITORING PROGRAM REVIEWED*: Not Applicable


*COPY OF PRESCRIPTION DRUG MONITORING REPORT IN PATIENT [facility]: Not Applicable


Instructions:  Hypoglycemia


Referrals: 


Brittanie Kirk MD [Primary Care Provider] - 


Forms:  ED Department Discharge


Additional Instructions: 


Evaluation in the emergency room today in regards to development of a 

hypoglycemic reaction i.e. blood sugar of 35.  This made you very combative and 

confused in your home.  Family members were unable to get you to take any 

significant glucose orally.  Paramedics were therefore summoned and they 

identified a blood sugar of only 35.  You were given an amp of dextrose or D50 

percent and your blood sugar improved over the next 10 minutes up to 285.  This 

improved your cognitive function and your behaviors.  When you arrived here you 

were significantly diaphoretic and your T-shirt was soaked with that and 

apparently were drooling quite badly due to low blood sugar.  Numbed up with 

warm blankets.  Were fed supper in the department.  You were given a small 

amount of sugar intravenously the department.  Sugar before you ate was 135 and 

then was 153 at 1900 hrs.  Time of discharge blood sugar had gone up to 236.  

Checking it in a couple hours but it is unlikely that you will need further 

carbohydrate intake tonight before bed.  Spent because of low blood sugar was 

taking insulin before supper without eating in time.





Sepsis Event Note





- Focused Exam


Vital Signs: 


 Vital Signs











  Temp Pulse Resp BP Pulse Ox


 


 05/14/20 18:04  35.1 C L  63  18  163/82 H  96











Date Exam was Performed: 05/14/20


Time Exam was Performed: 19:46





- My Orders


Last 24 Hours: 


My Active Orders





05/14/20 17:51


Blood Glucose Check, Bedside [RC] ONETIME 





05/14/20 18:00


Dextrose 5%-0.9% NaCl [Dextrose 5%-Normal Saline] 1,000 ml IV ASDIRECTED 





05/14/20 18:52


Blood Glucose Check, Bedside [RC] ONETIME 














- Assessment/Plan


Last 24 Hours: 


My Active Orders





05/14/20 17:51


Blood Glucose Check, Bedside [RC] ONETIME 





05/14/20 18:00


Dextrose 5%-0.9% NaCl [Dextrose 5%-Normal Saline] 1,000 ml IV ASDIRECTED 





05/14/20 18:52


Blood Glucose Check, Bedside [RC] ONETIME